# Patient Record
Sex: FEMALE | Race: WHITE | URBAN - METROPOLITAN AREA
[De-identification: names, ages, dates, MRNs, and addresses within clinical notes are randomized per-mention and may not be internally consistent; named-entity substitution may affect disease eponyms.]

---

## 2018-11-29 PROBLEM — F17.200 CURRENT EVERY DAY SMOKER: Status: ACTIVE | Noted: 2018-11-29

## 2018-11-29 PROBLEM — R05.3 PERSISTENT COUGH FOR 3 WEEKS OR LONGER: Status: ACTIVE | Noted: 2018-11-29

## 2018-11-29 PROBLEM — I34.1 MVP (MITRAL VALVE PROLAPSE): Status: ACTIVE | Noted: 2018-11-29

## 2018-11-29 PROBLEM — E04.1 SOLITARY NODULE OF RIGHT LOBE OF THYROID: Status: ACTIVE | Noted: 2018-11-29

## 2018-11-29 PROCEDURE — 81001 URINALYSIS AUTO W/SCOPE: CPT | Performed by: INTERNAL MEDICINE

## 2018-12-03 PROBLEM — E78.00 PURE HYPERCHOLESTEROLEMIA: Status: ACTIVE | Noted: 2018-12-03

## 2018-12-03 PROBLEM — R31.29 MICROHEMATURIA: Status: ACTIVE | Noted: 2018-12-03

## 2019-11-05 ENCOUNTER — TELEPHONE (OUTPATIENT)
Dept: NEUROLOGY | Facility: CLINIC | Age: 59
End: 2019-11-05

## 2019-11-05 NOTE — TELEPHONE ENCOUNTER
Received request from [facility] for Dr. Jackson Pérez notes from 1/1/10 to present. Faxed back no record statement. Provider was in private practice and those records are unattainable at this time. Fax receipt confirmed. Request sent to scanning.